# Patient Record
Sex: FEMALE | Race: WHITE | Employment: UNEMPLOYED | URBAN - METROPOLITAN AREA
[De-identification: names, ages, dates, MRNs, and addresses within clinical notes are randomized per-mention and may not be internally consistent; named-entity substitution may affect disease eponyms.]

---

## 2022-12-02 ENCOUNTER — HOSPITAL ENCOUNTER (OUTPATIENT)
Dept: MAMMOGRAPHY | Age: 52
Discharge: HOME | End: 2022-12-02
Payer: COMMERCIAL

## 2022-12-02 DIAGNOSIS — Z12.39 SCREENING BREAST EXAMINATION: ICD-10-CM

## 2022-12-02 PROCEDURE — 77063 BREAST TOMOSYNTHESIS BI: CPT

## 2022-12-05 ENCOUNTER — TELEPHONE (OUTPATIENT)
Dept: MAMMOGRAPHY | Age: 52
End: 2022-12-05

## 2022-12-05 NOTE — TELEPHONE ENCOUNTER
Wireless caller not available unalbe to leave message regarding negative screening mammogram results.

## 2023-11-06 ENCOUNTER — APPOINTMENT (OUTPATIENT)
Dept: GENERAL RADIOLOGY | Age: 53
End: 2023-11-06
Payer: COMMERCIAL

## 2023-11-06 ENCOUNTER — HOSPITAL ENCOUNTER (EMERGENCY)
Age: 53
Discharge: HOME | End: 2023-11-07
Attending: STUDENT IN AN ORGANIZED HEALTH CARE EDUCATION/TRAINING PROGRAM
Payer: COMMERCIAL

## 2023-11-06 DIAGNOSIS — S82.832A CLOSED FRACTURE OF DISTAL END OF LEFT FIBULA, UNSPECIFIED FRACTURE MORPHOLOGY, INITIAL ENCOUNTER: Primary | ICD-10-CM
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
## 2023-11-06 PROCEDURE — 29515 APPLICATION SHORT LEG SPLINT: CPT

## 2023-11-06 PROCEDURE — 73590 X-RAY EXAM OF LOWER LEG: CPT

## 2023-11-06 PROCEDURE — 73620 X-RAY EXAM OF FOOT: CPT

## 2023-11-06 PROCEDURE — 73600 X-RAY EXAM OF ANKLE: CPT

## 2023-11-06 PROCEDURE — 99283 EMERGENCY DEPT VISIT LOW MDM: CPT

## 2023-11-06 RX ORDER — OXYCODONE HYDROCHLORIDE AND ACETAMINOPHEN 5; 325 MG/1; MG/1
1 TABLET ORAL ONCE
Status: COMPLETED | OUTPATIENT
Start: 2023-11-06 | End: 2023-11-07

## 2023-11-06 ASSESSMENT — PAIN DESCRIPTION - DESCRIPTORS: DESCRIPTORS: THROBBING

## 2023-11-06 ASSESSMENT — PAIN DESCRIPTION - ORIENTATION: ORIENTATION: LEFT

## 2023-11-06 ASSESSMENT — PAIN DESCRIPTION - PAIN TYPE: TYPE: ACUTE PAIN

## 2023-11-06 ASSESSMENT — PAIN DESCRIPTION - LOCATION: LOCATION: ANKLE

## 2023-11-06 ASSESSMENT — PAIN - FUNCTIONAL ASSESSMENT: PAIN_FUNCTIONAL_ASSESSMENT: 0-10

## 2023-11-06 ASSESSMENT — PAIN SCALES - GENERAL: PAINLEVEL_OUTOF10: 10

## 2023-11-07 ENCOUNTER — TELEPHONE (OUTPATIENT)
Dept: ORTHOPEDIC SURGERY | Age: 53
End: 2023-11-07

## 2023-11-07 VITALS
SYSTOLIC BLOOD PRESSURE: 130 MMHG | DIASTOLIC BLOOD PRESSURE: 74 MMHG | HEART RATE: 94 BPM | HEIGHT: 63 IN | BODY MASS INDEX: 30.12 KG/M2 | WEIGHT: 170 LBS | OXYGEN SATURATION: 99 % | RESPIRATION RATE: 18 BRPM | TEMPERATURE: 98.3 F

## 2023-11-07 PROCEDURE — 6370000000 HC RX 637 (ALT 250 FOR IP): Performed by: STUDENT IN AN ORGANIZED HEALTH CARE EDUCATION/TRAINING PROGRAM

## 2023-11-07 RX ADMIN — OXYCODONE AND ACETAMINOPHEN 1 TABLET: 5; 325 TABLET ORAL at 00:11

## 2023-11-07 ASSESSMENT — PAIN DESCRIPTION - ORIENTATION: ORIENTATION: LEFT

## 2023-11-07 ASSESSMENT — PAIN SCALES - GENERAL: PAINLEVEL_OUTOF10: 10

## 2023-11-07 ASSESSMENT — PAIN DESCRIPTION - LOCATION: LOCATION: ANKLE

## 2023-11-07 NOTE — ED PROVIDER NOTES
procedures, treating other patients and teaching time. FINAL IMPRESSION      1. Closed fracture of distal end of left fibula, unspecified fracture morphology, initial encounter    2. Fall, initial encounter          DISPOSITION/PLAN   Disposition: DISPOSITION Decision To Discharge 11/06/2023 11:50:44 PM    Condition: stable     DISCHARGE MEDICATIONS:  Patient was given scripts for the following medications. I counseled patient how to take these medications:  Discharge Medication List as of 11/7/2023 12:22 AM          DISCONTINUED MEDICATIONS:  Discharge Medication List as of 11/7/2023 12:22 AM          FOLLOW UP:  Ghazala Feng MD  [address] [facility]  [phone]    Go to   tomorrow 11/6/23 at 1200 [facility], [address] [place] [address]  [phone]    Call in 1 day      [facility] ED  [facility] [place]  [phone]  Go to   As needed, If symptoms worsen      DISCLAIMER: This chart was created using Dragon dictation software. Efforts were made by me to ensure accuracy, however some errors may be present due to limitations of this technology and occasionally words are not transcribed correctly.     Agnes Rasher, MD Mark Eisenmenger, MD  11/07/23 [id number]

## 2023-11-07 NOTE — ED TRIAGE NOTES
Pt states that she got up to go to the bathroom and stood up too fast.  States that her legs just gave out and she fell. Pt denies LOC. Denies hitting her head. C/o L leg and ankle pain.

## 2023-11-07 NOTE — TELEPHONE ENCOUNTER
Left voicemail for patient to schedule/reschdule appointment with Dr. Clemente Herrera for  ankle - left . I advised them to call [phone] to make the next appointment available at their leisure.

## 2023-11-10 ENCOUNTER — OFFICE VISIT (OUTPATIENT)
Dept: ORTHOPEDIC SURGERY | Age: 53
End: 2023-11-10

## 2023-11-10 VITALS — BODY MASS INDEX: 30.12 KG/M2 | WEIGHT: 170 LBS | HEIGHT: 63 IN

## 2023-11-10 DIAGNOSIS — M25.572 LEFT ANKLE PAIN, UNSPECIFIED CHRONICITY: ICD-10-CM

## 2023-11-10 DIAGNOSIS — S82.832A OTHER CLOSED FRACTURE OF DISTAL END OF LEFT FIBULA, INITIAL ENCOUNTER: Primary | ICD-10-CM

## 2023-11-10 NOTE — PROGRESS NOTES
ORTHOPAEDIC SURGERY H&P / CONSULTATION NOTE    Chief complaint:   Chief Complaint   Patient presents with    Ankle Pain     NP LEFT ANKLE       History of present illness: The patient is a 46 y.o. female with subjective symptoms of left ankle pain. The chief complaint is located at lateral aspect of the left ankle. Duration of symptoms has been for 3 days. The severity of symptoms is rated at 10/10 pain at the time of injury however is improved with elevation over the last several days as listed on intake form. Patient states she had stood up too fast and ultimately fell awkwardly having injured her ankle on 11/6/2023. She went to the emergency room that day or ultimately she was diagnosed with a closed ankle fracture. She was instructed to follow-up with orthopedics. Attempt that earlier clinic visit this week had been limited by logistics on her end. She denies previous ankle injury. She has been in a splint nonweightbearing. She is accompanied by her daughter today. The patient has tried the below listed items prior to today's consultation for above listed chief complaint.     -   Over-the-counter anti-inflammatories/prescription medication anti-inflammatory. -   Physical therapy / guided home exercise program -     -   Previous corticosteroid injections    Past medical history:    Past Medical History:   Diagnosis Date    Asthma     Bipolar 1 disorder ([address])     GERD (gastroesophageal reflux disease)     Headache     Hypertension     Pneumonia     Schizophrenia ([address])         Past surgical history:    Past Surgical History:   Procedure Laterality Date    HYSTERECTOMY (CERVIX STATUS UNKNOWN)      TONSILLECTOMY          Allergies:     Allergies   Allergen Reactions    Bee Venom     Ibuprofen Swelling    Penicillins          Medications:   Current Outpatient Medications:     ibuprofen (ADVIL;MOTRIN) 200 MG tablet, Take 400 mg by mouth every morning, Disp: , Rfl:     ondansetron (ZOFRAN ODT) 4 MG

## 2023-11-17 ENCOUNTER — OFFICE VISIT (OUTPATIENT)
Dept: ORTHOPEDIC SURGERY | Age: 53
End: 2023-11-17

## 2023-11-17 VITALS — BODY MASS INDEX: 30.12 KG/M2 | WEIGHT: 170 LBS | HEIGHT: 63 IN

## 2023-11-17 DIAGNOSIS — S82.832D OTHER CLOSED FRACTURE OF DISTAL END OF LEFT FIBULA WITH ROUTINE HEALING, SUBSEQUENT ENCOUNTER: Primary | ICD-10-CM

## 2023-11-17 DIAGNOSIS — M25.572 LEFT ANKLE PAIN, UNSPECIFIED CHRONICITY: ICD-10-CM

## 2023-11-17 NOTE — PROGRESS NOTES
FOLLOW UP ORTHOPAEDIC NOTE    The patient follows up today for reevaluation of left ankle pain/injury. The patient states 8/10 pain at its worst however she feels that the pain has been improving over the last week on intake form. Patient will continue nonweightbearing status. PE:  AAOx3  RR  Unlabored breathing  Skin warm and moist  Focused physical examination of the left ankle  Skin intact  Slight tenderness to palpation along the lateral aspect of the fibula. Nontender to palpation at the medial deltoid or medial malleolus. Resolving ecchymosis  Neurovascular exam unchanged    Pertinent radiographs/imaging:  3 view left ankle 11/17/2023 in comparison to previous radiographs on 11/10/2023: Maintained stable SER 2 distal fibula fracture without gross displacement     Diagnosis Orders   1. Other closed fracture of distal end of left fibula with routine healing, subsequent encounter        2. Left ankle pain, unspecified chronicity  XR ANKLE LEFT (MIN 3 VIEWS)        Assessment and plan: 46 female with continued subjective symptoms of left ankle pain with known, correlating diagnosis of nondisplaced left ankle stable fracture SER 2.  -Time of 16 minutes was spent coordinating and discussing the clinical findings and diagnostic imaging results as they pertain to the patient's presenting subjective symptoms.  -I had a pleasant discussion with the patient and her daughter who accompanies her today. I reviewed with her that currently her clinical examination is improving. She does have ecchymosis around the ankle and so I did review with them continued padding even within the boot and this was provided for them today and placed in the heel with an ABD pad.   She will also work on limiting pressure there.  -Continue current treatment plan as this is doing well without any gross interval displacement  -Continue nonweightbearing status and ice and elevation  -OTC Tylenol per bottle as needed discomfort  -Follow-up

## 2023-12-01 ENCOUNTER — OFFICE VISIT (OUTPATIENT)
Dept: ORTHOPEDIC SURGERY | Age: 53
End: 2023-12-01

## 2023-12-01 VITALS — BODY MASS INDEX: 31.28 KG/M2 | HEIGHT: 62 IN | WEIGHT: 170 LBS

## 2023-12-01 DIAGNOSIS — S82.832D OTHER CLOSED FRACTURE OF DISTAL END OF LEFT FIBULA WITH ROUTINE HEALING, SUBSEQUENT ENCOUNTER: Primary | ICD-10-CM

## 2023-12-01 NOTE — PROGRESS NOTES
from 2 crutch to 1 crutch, 1 crutch to no crutch and then weightbearing as tolerated in the boot until follow-up. She will be working with physical therapy as well which will assist with this and her overall progressions of weightbearing.  -She will follow-up in 2-3 weeks from now for a total 6-week post injury visit with three-view nonweightbearing left ankle  -All questions answered to the patient's satisfaction and the patient expressed understanding and agreement with the above listed treatment plan  -Follow up in 2-3 weeks per the above with three-view nonweightbearing left ankle  -Thank you for the clinical consultation and allowing me to participate in the patient's care. Electronically signed by Yary Green MD on 12/1/23 at 9:51 AM EST         Yary Green MD       Orthopaedic Surgery-Sports Medicine    Disclaimer: This note was dictated with voice recognition software. Though review and correction are routinely performed, please contact the office/medical records for any errors requiring correction.

## 2023-12-15 ENCOUNTER — HOSPITAL ENCOUNTER (OUTPATIENT)
Dept: PHYSICAL THERAPY | Age: 53
Setting detail: THERAPIES SERIES
Discharge: HOME | End: 2023-12-15
Payer: COMMERCIAL

## 2023-12-15 ENCOUNTER — OFFICE VISIT (OUTPATIENT)
Dept: ORTHOPEDIC SURGERY | Age: 53
End: 2023-12-15

## 2023-12-15 VITALS — WEIGHT: 170 LBS | HEIGHT: 62 IN | BODY MASS INDEX: 31.28 KG/M2

## 2023-12-15 DIAGNOSIS — M25.572 LEFT ANKLE PAIN, UNSPECIFIED CHRONICITY: ICD-10-CM

## 2023-12-15 DIAGNOSIS — S82.832D OTHER CLOSED FRACTURE OF DISTAL END OF LEFT FIBULA WITH ROUTINE HEALING, SUBSEQUENT ENCOUNTER: Primary | ICD-10-CM

## 2023-12-15 PROCEDURE — 97161 PT EVAL LOW COMPLEX 20 MIN: CPT | Performed by: PHYSICAL THERAPIST

## 2023-12-15 PROCEDURE — 97110 THERAPEUTIC EXERCISES: CPT | Performed by: PHYSICAL THERAPIST

## 2023-12-15 NOTE — PROGRESS NOTES
discomfort  -Activity modification as symptoms require  -Advocating discontinuing rubbing alcohol use on the left ankle and to just use lotion  -All questions answered to the patient's satisfaction and the patient expressed understanding and agreement with the above listed treatment plan  -Follow up in 6 weeks time for a 3-month routine post injury visit with 3view left ankle  -Thank you for the clinical consultation and allowing me to participate in the patient's care. Electronically signed by Jose Shea MD on 12/15/23 at 9:52 AM CLINT Shea MD       Orthopaedic Surgery-Sports Medicine    Disclaimer: This note was dictated with voice recognition software. Though review and correction are routinely performed, please contact the office/medical records for any errors requiring correction.

## 2023-12-15 NOTE — FLOWSHEET NOTE
skilled intervention: [x] Yes  [] No      GOALS       Therapist goals for Patient:   Short Term Goals: To be achieved in: 2 weeks  Independent in HEP and progression per patient tolerance, in order to progress toward full function and prevent re-injury. Status: [] Progressing: [] Met: [] Not Met: [] Adjusted  Patient will have a decrease in pain to 1/10 to help  facilitate improvement in movement, function, and ADLs as indicated by functional deficits. Status: [] Progressing: [] Met: [] Not Met: [] Adjusted    Long Term Goals: To be achieved in: 6 weeks  Disability index score of 4% or less for the LEFS to assist with return top prior level of function. Status: [] Progressing: [] Met: [] Not Met: [] Adjusted  LLE AROM = RLE AROM to allow for proper joint functioning as indicated by patients functional deficits. Status: [] Progressing: [] Met: [] Not Met: [] Adjusted  Pt to improve strength by 2 muscle grades or better of peroneals to work toward proper functional mobility and improving technique of ADLs. Status: [] Progressing: [] Met: [] Not Met: [] Adjusted  Patient will return to putting shoes/socks on, walk 2 blocks, walking on uneven ground, walk 1 mile, and up/down 1 flight of stairs without increased symptoms or restriction to work towards return to prior level of function. Status: [] Progressing: [] Met: [] Not Met: [] Adjusted  Patient will increase LE function to allow independence in all self-care activities. Patient will resume normal work/leisure activities without pain. Overall Progression Towards Functional goals/ Treatment Progress Update:  [] Patient is progressing as expected towards functional goals listed. [] Progression is slowed due to complexities/Impairments listed. [] Progression has been slowed due to co-morbidities.   [x] Plan just implemented, too soon (<30days) to assess goals progression   [] Goals require adjustment due to lack of progress  [] Patient is not

## 2023-12-22 ENCOUNTER — APPOINTMENT (OUTPATIENT)
Dept: PHYSICAL THERAPY | Age: 53
End: 2023-12-22
Payer: COMMERCIAL

## 2023-12-29 ENCOUNTER — HOSPITAL ENCOUNTER (OUTPATIENT)
Dept: PHYSICAL THERAPY | Age: 53
Setting detail: THERAPIES SERIES
Discharge: HOME | End: 2023-12-29
Payer: COMMERCIAL

## 2023-12-29 PROCEDURE — 97140 MANUAL THERAPY 1/> REGIONS: CPT | Performed by: PHYSICAL THERAPIST

## 2023-12-29 PROCEDURE — 97110 THERAPEUTIC EXERCISES: CPT | Performed by: PHYSICAL THERAPIST

## 2023-12-29 PROCEDURE — 97112 NEUROMUSCULAR REEDUCATION: CPT | Performed by: PHYSICAL THERAPIST

## 2023-12-29 NOTE — FLOWSHEET NOTE
[id number]) [address] [facility] - Reviewed/Progressed HEP activities related to strengthening, flexibility, endurance, ROM performed to prevent loss of range of motion, maintain or improve muscular strength or increase flexibility, following either an injury or surgery. TREATMENT PLAN   Plan: Cont POC- Continue emphasis/focus on exercise progression, increasing ROM, allowing for proper ROM, and static and dynamic balance. Next visit plan to progress weights, progress reps, add new exercises, and progress balance     Electronically Signed by Sarah Hammonds PT              Date: 12/29/2023     Note: If patient does not return for scheduled/recommended follow up visits, this note will serve as a discharge from care along with the most recent update on progress.

## 2024-01-05 ENCOUNTER — HOSPITAL ENCOUNTER (OUTPATIENT)
Dept: PHYSICAL THERAPY | Age: 54
Setting detail: THERAPIES SERIES
Discharge: HOME | End: 2024-01-05
Payer: COMMERCIAL

## 2024-01-05 PROCEDURE — 97140 MANUAL THERAPY 1/> REGIONS: CPT | Performed by: PHYSICAL THERAPIST

## 2024-01-05 PROCEDURE — 97112 NEUROMUSCULAR REEDUCATION: CPT | Performed by: PHYSICAL THERAPIST

## 2024-01-05 PROCEDURE — 97110 THERAPEUTIC EXERCISES: CPT | Performed by: PHYSICAL THERAPIST

## 2024-01-05 NOTE — FLOWSHEET NOTE
ADLs as indicated by functional deficits.   Status: [] Progressing: [] Met: [] Not Met: [] Adjusted    Long Term Goals: To be achieved in: 6 weeks  Disability index score of 4% or less for the LEFS to assist with return top prior level of function.    Status: [] Progressing: [] Met: [] Not Met: [] Adjusted  LLE AROM = RLE AROM to allow for proper joint functioning as indicated by patients functional deficits.  Status: [] Progressing: [] Met: [] Not Met: [] Adjusted  Pt to improve strength by 2 muscle grades or better of peroneals to work toward proper functional mobility and improving technique of ADLs.   Status: [] Progressing: [] Met: [] Not Met: [] Adjusted  Patient will return to putting shoes/socks on, walk 2 blocks, walking on uneven ground, walk 1 mile, and up/down 1 flight of stairs without increased symptoms or restriction to work towards return to prior level of function.  Status: [] Progressing: [] Met: [] Not Met: [] Adjusted  Patient will increase LE function to allow independence in all self-care activities.   Patient will resume normal work/leisure activities without pain.      Overall Progression Towards Functional goals/ Treatment Progress Update:  [] Patient is progressing as expected towards functional goals listed.    [] Progression is slowed due to complexities/Impairments listed.  [] Progression has been slowed due to co-morbidities.  [x] Plan just implemented, too soon (<30days) to assess goals progression   [] Goals require adjustment due to lack of progress  [] Patient is not progressing as expected and requires additional follow up with physician  [] Other:     CHARGE CAPTURE     PT CHARGE GRID   CPT Code (TIMED) minutes # CPT Code (UNTIMED) #     Therex (61001)  25 1  EVAL:LOW (63985 - Typically 20 minutes face-to-face)     Neuromusc. Re-ed (63182) 8 1  Re-Eval (61098)     Manual (54391) 8 1  Estim Unattended (95738)     Ther. Act (36765)    Mech. Traction (10554)     Gait (54039)    Dry